# Patient Record
Sex: MALE | Race: BLACK OR AFRICAN AMERICAN | NOT HISPANIC OR LATINO | Employment: FULL TIME | URBAN - METROPOLITAN AREA
[De-identification: names, ages, dates, MRNs, and addresses within clinical notes are randomized per-mention and may not be internally consistent; named-entity substitution may affect disease eponyms.]

---

## 2024-03-04 PROBLEM — H15.012 ANTERIOR SCLERITIS, LEFT EYE: Status: ACTIVE | Noted: 2024-03-04

## 2024-03-04 PROBLEM — E78.5 HYPERLIPEMIA: Status: ACTIVE | Noted: 2024-03-04

## 2024-03-04 PROBLEM — I83.019: Status: ACTIVE | Noted: 2024-03-04

## 2024-03-04 PROBLEM — R20.2 PARESTHESIA OF BOTH LEGS: Status: ACTIVE | Noted: 2024-03-04

## 2024-03-04 PROBLEM — R07.9 CHEST PAIN: Status: ACTIVE | Noted: 2024-03-04

## 2024-03-04 PROBLEM — S61.219A LACERATION OF SKIN OF FINGER: Status: ACTIVE | Noted: 2024-03-04

## 2024-03-04 PROBLEM — L73.9 FOLLICULITIS: Status: ACTIVE | Noted: 2024-03-04

## 2024-03-04 PROBLEM — R10.11 ABDOMINAL PAIN, RUQ (RIGHT UPPER QUADRANT): Status: ACTIVE | Noted: 2024-03-04

## 2024-03-04 PROBLEM — R00.2 PALPITATIONS: Status: ACTIVE | Noted: 2024-03-04

## 2024-03-04 PROBLEM — K21.9 GERD (GASTROESOPHAGEAL REFLUX DISEASE): Status: ACTIVE | Noted: 2024-03-04

## 2024-03-04 PROBLEM — R10.9 RIGHT FLANK PAIN: Status: ACTIVE | Noted: 2024-03-04

## 2024-03-04 PROBLEM — A60.00 GENITAL HERPES SIMPLEX: Status: ACTIVE | Noted: 2024-03-04

## 2024-03-04 PROBLEM — I83.899 BLEEDING FROM VARICOSE VEIN: Status: ACTIVE | Noted: 2024-03-04

## 2024-03-04 PROBLEM — L97.919: Status: ACTIVE | Noted: 2024-03-04

## 2024-03-04 RX ORDER — INDOMETHACIN 25 MG/1
CAPSULE ORAL
COMMUNITY
Start: 2021-11-16 | End: 2024-04-04 | Stop reason: WASHOUT

## 2024-03-04 RX ORDER — PREDNISOLONE ACETATE 10 MG/ML
SUSPENSION/ DROPS OPHTHALMIC 4 TIMES DAILY
COMMUNITY
Start: 2021-11-16 | End: 2024-04-04 | Stop reason: WASHOUT

## 2024-03-04 RX ORDER — CYCLOPENTOLATE HYDROCHLORIDE 10 MG/ML
SOLUTION/ DROPS OPHTHALMIC
COMMUNITY
Start: 2021-11-16 | End: 2024-04-04 | Stop reason: WASHOUT

## 2024-03-04 RX ORDER — GLUCOSAM/CHONDRO/HERB 149/HYAL 750-100 MG
TABLET ORAL
COMMUNITY

## 2024-03-05 ENCOUNTER — APPOINTMENT (OUTPATIENT)
Dept: PRIMARY CARE | Facility: CLINIC | Age: 44
End: 2024-03-05
Payer: COMMERCIAL

## 2024-03-12 ENCOUNTER — APPOINTMENT (OUTPATIENT)
Dept: PRIMARY CARE | Facility: CLINIC | Age: 44
End: 2024-03-12
Payer: COMMERCIAL

## 2024-04-04 ENCOUNTER — OFFICE VISIT (OUTPATIENT)
Dept: PRIMARY CARE | Facility: CLINIC | Age: 44
End: 2024-04-04
Payer: COMMERCIAL

## 2024-04-04 VITALS
OXYGEN SATURATION: 100 % | DIASTOLIC BLOOD PRESSURE: 103 MMHG | SYSTOLIC BLOOD PRESSURE: 153 MMHG | TEMPERATURE: 97.3 F | BODY MASS INDEX: 28.71 KG/M2 | WEIGHT: 212 LBS | HEART RATE: 84 BPM | RESPIRATION RATE: 16 BRPM | HEIGHT: 72 IN

## 2024-04-04 DIAGNOSIS — E66.3 OVERWEIGHT: ICD-10-CM

## 2024-04-04 DIAGNOSIS — F10.982 ALCOHOL-INDUCED INSOMNIA (MULTI): ICD-10-CM

## 2024-04-04 DIAGNOSIS — I10 PRIMARY HYPERTENSION: Primary | ICD-10-CM

## 2024-04-04 DIAGNOSIS — Z11.3 SCREENING FOR STD (SEXUALLY TRANSMITTED DISEASE): ICD-10-CM

## 2024-04-04 DIAGNOSIS — F10.10 ALCOHOL ABUSE: ICD-10-CM

## 2024-04-04 DIAGNOSIS — R29.818 SUSPECTED SLEEP APNEA: ICD-10-CM

## 2024-04-04 LAB
ANION GAP SERPL CALC-SCNC: 18 MMOL/L (ref 10–20)
BUN SERPL-MCNC: 10 MG/DL (ref 6–23)
CALCIUM SERPL-MCNC: 9.9 MG/DL (ref 8.6–10.6)
CHLORIDE SERPL-SCNC: 100 MMOL/L (ref 98–107)
CHOLEST SERPL-MCNC: 295 MG/DL (ref 0–199)
CHOLESTEROL/HDL RATIO: 3.1
CO2 SERPL-SCNC: 26 MMOL/L (ref 21–32)
CREAT SERPL-MCNC: 1.01 MG/DL (ref 0.5–1.3)
EGFRCR SERPLBLD CKD-EPI 2021: >90 ML/MIN/1.73M*2
ERYTHROCYTE [DISTWIDTH] IN BLOOD BY AUTOMATED COUNT: 12.5 % (ref 11.5–14.5)
EST. AVERAGE GLUCOSE BLD GHB EST-MCNC: 111 MG/DL
GLUCOSE SERPL-MCNC: 84 MG/DL (ref 74–99)
HBA1C MFR BLD: 5.5 %
HCT VFR BLD AUTO: 49.9 % (ref 41–52)
HDLC SERPL-MCNC: 95.5 MG/DL
HGB BLD-MCNC: 16 G/DL (ref 13.5–17.5)
HIV 1+2 AB+HIV1 P24 AG SERPL QL IA: NONREACTIVE
LDLC SERPL CALC-MCNC: 184 MG/DL
MCH RBC QN AUTO: 28.2 PG (ref 26–34)
MCHC RBC AUTO-ENTMCNC: 32.1 G/DL (ref 32–36)
MCV RBC AUTO: 88 FL (ref 80–100)
NON HDL CHOLESTEROL: 200 MG/DL (ref 0–149)
NRBC BLD-RTO: 0 /100 WBCS (ref 0–0)
PLATELET # BLD AUTO: 186 X10*3/UL (ref 150–450)
POTASSIUM SERPL-SCNC: 3.8 MMOL/L (ref 3.5–5.3)
RBC # BLD AUTO: 5.67 X10*6/UL (ref 4.5–5.9)
SODIUM SERPL-SCNC: 140 MMOL/L (ref 136–145)
TREPONEMA PALLIDUM IGG+IGM AB [PRESENCE] IN SERUM OR PLASMA BY IMMUNOASSAY: NONREACTIVE
TRIGL SERPL-MCNC: 79 MG/DL (ref 0–149)
TSH SERPL-ACNC: 1.2 MIU/L (ref 0.44–3.98)
VLDL: 16 MG/DL (ref 0–40)
WBC # BLD AUTO: 6.4 X10*3/UL (ref 4.4–11.3)

## 2024-04-04 PROCEDURE — 1036F TOBACCO NON-USER: CPT | Performed by: NURSE PRACTITIONER

## 2024-04-04 PROCEDURE — 87661 TRICHOMONAS VAGINALIS AMPLIF: CPT | Mod: 59 | Performed by: NURSE PRACTITIONER

## 2024-04-04 PROCEDURE — 86780 TREPONEMA PALLIDUM: CPT | Performed by: NURSE PRACTITIONER

## 2024-04-04 PROCEDURE — 87389 HIV-1 AG W/HIV-1&-2 AB AG IA: CPT | Performed by: NURSE PRACTITIONER

## 2024-04-04 PROCEDURE — 3080F DIAST BP >= 90 MM HG: CPT | Performed by: NURSE PRACTITIONER

## 2024-04-04 PROCEDURE — 99204 OFFICE O/P NEW MOD 45 MIN: CPT | Performed by: NURSE PRACTITIONER

## 2024-04-04 PROCEDURE — 80061 LIPID PANEL: CPT | Performed by: NURSE PRACTITIONER

## 2024-04-04 PROCEDURE — 87800 DETECT AGNT MULT DNA DIREC: CPT | Performed by: NURSE PRACTITIONER

## 2024-04-04 PROCEDURE — 80048 BASIC METABOLIC PNL TOTAL CA: CPT | Performed by: NURSE PRACTITIONER

## 2024-04-04 PROCEDURE — 36415 COLL VENOUS BLD VENIPUNCTURE: CPT | Performed by: NURSE PRACTITIONER

## 2024-04-04 PROCEDURE — 83036 HEMOGLOBIN GLYCOSYLATED A1C: CPT | Performed by: NURSE PRACTITIONER

## 2024-04-04 PROCEDURE — 85027 COMPLETE CBC AUTOMATED: CPT | Performed by: NURSE PRACTITIONER

## 2024-04-04 PROCEDURE — 84443 ASSAY THYROID STIM HORMONE: CPT | Performed by: NURSE PRACTITIONER

## 2024-04-04 PROCEDURE — 99214 OFFICE O/P EST MOD 30 MIN: CPT | Performed by: NURSE PRACTITIONER

## 2024-04-04 PROCEDURE — 3077F SYST BP >= 140 MM HG: CPT | Performed by: NURSE PRACTITIONER

## 2024-04-04 RX ORDER — LOSARTAN POTASSIUM 50 MG/1
50 TABLET ORAL DAILY
Qty: 90 TABLET | Refills: 1 | Status: SHIPPED | OUTPATIENT
Start: 2024-04-04 | End: 2025-04-04

## 2024-04-04 RX ORDER — CALCIUM CARB/MAGNESIUM CARB 311-232MG
5 TABLET ORAL NIGHTLY PRN
Qty: 90 TABLET | Refills: 3 | Status: SHIPPED | OUTPATIENT
Start: 2024-04-04

## 2024-04-04 ASSESSMENT — PAIN SCALES - GENERAL: PAINLEVEL: 0-NO PAIN

## 2024-04-04 NOTE — PROGRESS NOTES
"Subjective   Merry Sutherland is a 43 y.o. male who presents for Establish Care.  HPI  Mr. Sutherland is here today for NPV. Patient denies known past medical history.   Notes that he was once told he had high blood pressure but at the time was recommended for lifestyle modifications. Denies headache, chest pain, palpitations, blurred vision, or dizziness    He reports that in the last few months he has been drinking alcohol in excess. He is motivated to change his lifestyle. He declines resources. He is unsure of family history of HTN or otherwise. He does like to exercise, and did so this morning. He would also like to work on improving his diet and sleep.  Notes that he will fall asleep in his \"man cave\" often with the lights and television on. He will then attempt to go up into his bed and will have challenge falling asleep. He does snore, often very loudly. Denies previous evaluation for sleep apnea.       All systems reviewed. Review of systems negative except for noted positives in HPI    Objective     BP (!) 153/103   Pulse 84   Temp 36.3 °C (97.3 °F)   Resp 16   Ht 1.829 m (6')   Wt 96.2 kg (212 lb)   SpO2 100%   BMI 28.75 kg/m²    Vital signs noted and reviewed.       Physical Exam  Constitutional:       Appearance: Normal appearance.   Cardiovascular:      Rate and Rhythm: Normal rate and regular rhythm.   Pulmonary:      Effort: Pulmonary effort is normal. No respiratory distress.      Breath sounds: Normal breath sounds.   Skin:     General: Skin is warm and dry.   Neurological:      Mental Status: He is oriented to person, place, and time.   Psychiatric:         Mood and Affect: Mood normal.             Assessment/Plan   Problem List Items Addressed This Visit    None  Visit Diagnoses       Primary hypertension    -  Primary    Relevant Medications    losartan (Cozaar) 50 mg tablet    Other Relevant Orders    Basic Metabolic Panel    Hemoglobin A1C    TSH with reflex to Free T4 if abnormal    CBC    " Suspected sleep apnea        Relevant Orders    Referral to Adult Sleep Medicine    Alcohol-induced insomnia (CMS/HCC)        Relevant Medications    melatonin 5 mg tablet,disintegrating    Screening for STD (sexually transmitted disease)        Relevant Orders    Syphilis Screen with Reflex    C. Trachomatis / N. Gonorrhoeae, Amplified Detection    Trichomonas vaginalis, Nucleic Acid Detection    HIV 1/2 Antigen/Antibody Screen with Reflex to Confirmation    Alcohol abuse        Overweight        Relevant Orders    Lipid panel    TSH with reflex to Free T4 if abnormal    CBC

## 2024-04-04 NOTE — PATIENT INSTRUCTIONS
Thank you for coming in for your visit today!    Please follow up in 6 weeks for next blood pressure check    For your blood pressure:  START losartan. This medication may be taken in the morning or at night. Be sure to take the medication consistently.   Take your medication as directed. Try to take it around the same time daily.   Keep a log of your blood pressure. Be sure to bring it with you to your next appointment so we can review it together.  Adhere to the DASH diet. This includes decreasing your salt/sodium intake. Avoid canned foods, lunch meats, and frozen foods.  Exercise for 30 minutes daily.  Reduce or eliminate alcohol consumption.    Take melatonin 30-60 minutes prior to desired bedtime.   Develop a sleep routine.  Discontinue all screens prior to bedtime including cell phone or television.     Call to schedule with sleep medicine for suspected sleep apnea.     For reflux:   AVOID spicy, acidic, or fried foods.  DO NOT EAT 2-3 hours before lying flat or going to bed  Decrease or eliminate alcohol consumption.     Today we completed blood work. We will contact you with any abnormalities from this testing.    Call 911 or go to the emergency room if you have pain in your chest, difficulty breathing, or other life threatening symptoms.

## 2024-04-05 LAB
C TRACH RRNA SPEC QL NAA+PROBE: NEGATIVE
N GONORRHOEA DNA SPEC QL PROBE+SIG AMP: NEGATIVE
T VAGINALIS RRNA SPEC QL NAA+PROBE: NEGATIVE

## 2024-10-14 ENCOUNTER — APPOINTMENT (OUTPATIENT)
Dept: PRIMARY CARE | Facility: CLINIC | Age: 44
End: 2024-10-14
Payer: COMMERCIAL

## 2024-11-14 ENCOUNTER — OFFICE VISIT (OUTPATIENT)
Dept: PRIMARY CARE | Facility: CLINIC | Age: 44
End: 2024-11-14
Payer: COMMERCIAL

## 2024-11-14 VITALS
HEART RATE: 101 BPM | RESPIRATION RATE: 16 BRPM | OXYGEN SATURATION: 99 % | WEIGHT: 206 LBS | SYSTOLIC BLOOD PRESSURE: 137 MMHG | HEIGHT: 72 IN | TEMPERATURE: 98 F | BODY MASS INDEX: 27.9 KG/M2 | DIASTOLIC BLOOD PRESSURE: 80 MMHG

## 2024-11-14 DIAGNOSIS — I10 PRIMARY HYPERTENSION: ICD-10-CM

## 2024-11-14 DIAGNOSIS — G89.29 CHRONIC LEFT-SIDED LOW BACK PAIN WITH LEFT-SIDED SCIATICA: ICD-10-CM

## 2024-11-14 DIAGNOSIS — E78.2 MIXED HYPERLIPIDEMIA: ICD-10-CM

## 2024-11-14 DIAGNOSIS — Z11.3 SCREENING FOR STD (SEXUALLY TRANSMITTED DISEASE): ICD-10-CM

## 2024-11-14 DIAGNOSIS — M54.42 CHRONIC LEFT-SIDED LOW BACK PAIN WITH LEFT-SIDED SCIATICA: ICD-10-CM

## 2024-11-14 DIAGNOSIS — D17.1 LIPOMA OF TORSO: Primary | ICD-10-CM

## 2024-11-14 LAB
25(OH)D3 SERPL-MCNC: 15 NG/ML (ref 30–100)
ANION GAP SERPL CALC-SCNC: 17 MMOL/L (ref 10–20)
BUN SERPL-MCNC: 12 MG/DL (ref 6–23)
CALCIUM SERPL-MCNC: 9.4 MG/DL (ref 8.6–10.6)
CHLORIDE SERPL-SCNC: 102 MMOL/L (ref 98–107)
CHOLEST SERPL-MCNC: 272 MG/DL (ref 0–199)
CHOLESTEROL/HDL RATIO: 3.3
CO2 SERPL-SCNC: 25 MMOL/L (ref 21–32)
CREAT SERPL-MCNC: 0.97 MG/DL (ref 0.5–1.3)
EGFRCR SERPLBLD CKD-EPI 2021: >90 ML/MIN/1.73M*2
EST. AVERAGE GLUCOSE BLD GHB EST-MCNC: 105 MG/DL
GLUCOSE SERPL-MCNC: 83 MG/DL (ref 74–99)
HBA1C MFR BLD: 5.3 %
HDLC SERPL-MCNC: 82.3 MG/DL
HIV 1+2 AB+HIV1 P24 AG SERPL QL IA: NONREACTIVE
LDLC SERPL CALC-MCNC: 163 MG/DL
NON HDL CHOLESTEROL: 190 MG/DL (ref 0–149)
POTASSIUM SERPL-SCNC: 3.9 MMOL/L (ref 3.5–5.3)
SODIUM SERPL-SCNC: 140 MMOL/L (ref 136–145)
TREPONEMA PALLIDUM IGG+IGM AB [PRESENCE] IN SERUM OR PLASMA BY IMMUNOASSAY: NONREACTIVE
TRIGL SERPL-MCNC: 136 MG/DL (ref 0–149)
VLDL: 27 MG/DL (ref 0–40)

## 2024-11-14 PROCEDURE — 99214 OFFICE O/P EST MOD 30 MIN: CPT | Performed by: NURSE PRACTITIONER

## 2024-11-14 PROCEDURE — 82306 VITAMIN D 25 HYDROXY: CPT | Performed by: NURSE PRACTITIONER

## 2024-11-14 PROCEDURE — 83036 HEMOGLOBIN GLYCOSYLATED A1C: CPT | Performed by: NURSE PRACTITIONER

## 2024-11-14 PROCEDURE — 36415 COLL VENOUS BLD VENIPUNCTURE: CPT | Performed by: NURSE PRACTITIONER

## 2024-11-14 PROCEDURE — 86780 TREPONEMA PALLIDUM: CPT | Performed by: NURSE PRACTITIONER

## 2024-11-14 PROCEDURE — 80061 LIPID PANEL: CPT | Performed by: NURSE PRACTITIONER

## 2024-11-14 PROCEDURE — 3008F BODY MASS INDEX DOCD: CPT | Performed by: NURSE PRACTITIONER

## 2024-11-14 PROCEDURE — 80048 BASIC METABOLIC PNL TOTAL CA: CPT | Performed by: NURSE PRACTITIONER

## 2024-11-14 PROCEDURE — 90715 TDAP VACCINE 7 YRS/> IM: CPT | Performed by: NURSE PRACTITIONER

## 2024-11-14 PROCEDURE — 3075F SYST BP GE 130 - 139MM HG: CPT | Performed by: NURSE PRACTITIONER

## 2024-11-14 PROCEDURE — 87389 HIV-1 AG W/HIV-1&-2 AB AG IA: CPT | Performed by: NURSE PRACTITIONER

## 2024-11-14 PROCEDURE — 87491 CHLMYD TRACH DNA AMP PROBE: CPT | Performed by: NURSE PRACTITIONER

## 2024-11-14 PROCEDURE — 87661 TRICHOMONAS VAGINALIS AMPLIF: CPT | Performed by: NURSE PRACTITIONER

## 2024-11-14 PROCEDURE — 3079F DIAST BP 80-89 MM HG: CPT | Performed by: NURSE PRACTITIONER

## 2024-11-14 PROCEDURE — 1036F TOBACCO NON-USER: CPT | Performed by: NURSE PRACTITIONER

## 2024-11-14 ASSESSMENT — PAIN SCALES - GENERAL: PAINLEVEL_OUTOF10: 0-NO PAIN

## 2024-11-14 NOTE — PROGRESS NOTES
The patient received Tdap Adacel vaccine in the right deltoid.   Screening is done.   VIS sheet is given and reviewed.  Patient tolerated well.

## 2024-11-14 NOTE — PATIENT INSTRUCTIONS
Thank you for coming in for your visit today!    Please follow up in 5 months or sooner if needed    Today we completed blood work. We will contact you with any abnormalities from this testing.      For your blood pressure:  Continue to reduce alcohol consumption  Keep a log of your blood pressure. Be sure to bring it with you to your next appointment so we can review it together.  Adhere to the DASH diet. This includes decreasing your salt/sodium intake. Avoid canned foods, lunch meats, and frozen foods.  Exercise for 30 minutes daily.    For back pain and guidance with stretches, call to schedule with physical therapy.    For lipoma removal, call to schedule with general surgery.    Call 911 or go to the emergency room if you have pain in your chest, difficulty breathing, or other life threatening symptoms.

## 2024-11-14 NOTE — PROGRESS NOTES
Subjective   Merry Sutherland is a 44 y.o. male who presents for Follow-up (ED).  HPI  Mr. Sutherland is a 45 yo M here today for follow up.  Lost to previous follow up after starting losartan in April. Denies headache, chest pain, palpitations, blurred vision, or dizziness. He has not taken medication in several months.  Alcohol intake: he has reduced, now less than 3 drinks 3x per week. He notes that he does still occasionally binge, last being when he went out of town to celebrate his birthday. He is unsure of blood pressure values at that time, but denies symptoms. He notes a reduction in life stress- he was previously dealing with grief as well as some court related stressors and was coping with alcohol in the past. He feels he is managing stress in healthy ways now.     Was seen in the ED in August for heart palpitations. Today patient reports he has not experienced these symptoms in recent times.     Wants to improve his diet. Declines a nutritionist at this time. He notes that he is also focused on exercising more regularly.     He notes that he was previously told that he has a pinched nerve in his L leg and foot Notes some pins and needles sensation. This is unilateral. He discussed going to an acupuncturist in the past but never fulfilled the referral. Notes some low back pain and occasional neck pain, which comes and goes.   Doesn't stretch, now has a gym, just moved. May be open to PT consult.    History of axillary boils, but has an area of concern on his back. It has been there for some time. It is not painful and does not have discharge.         All systems reviewed. Review of systems negative except for noted positives in HPI    Objective     /80   Pulse 101   Temp 36.7 °C (98 °F)   Resp 16   Ht 1.829 m (6')   Wt 93.4 kg (206 lb)   SpO2 99%   BMI 27.94 kg/m²    Vital signs noted and reviewed.       Physical Exam  Constitutional:       Appearance: Normal appearance.   Cardiovascular:      Rate  and Rhythm: Normal rate and regular rhythm.   Pulmonary:      Effort: Pulmonary effort is normal. No respiratory distress.      Breath sounds: Normal breath sounds.   Skin:     General: Skin is warm and dry.      Comments: Lipoma to L mid back.   Neurological:      Mental Status: He is oriented to person, place, and time.   Psychiatric:         Mood and Affect: Mood normal.             Assessment/Plan   Problem List Items Addressed This Visit       Hyperlipemia    Relevant Orders    Lipid Panel     Other Visit Diagnoses       Lipoma of torso    -  Primary    Relevant Orders    Referral to General Surgery    Primary hypertension        Relevant Orders    Hemoglobin A1C    Basic Metabolic Panel    Vitamin D 25-Hydroxy,Total (for eval of Vitamin D levels)    Chronic left-sided low back pain with left-sided sciatica        Relevant Orders    Referral to Physical Therapy    Hemoglobin A1C    Screening for STD (sexually transmitted disease)        Relevant Orders    Syphilis Screen with Reflex    C. trachomatis / N. gonorrhoeae, Amplified    Trichomonas vaginalis, Amplified    HIV 1/2 Antigen/Antibody Screen with Reflex to Confirmation

## 2025-01-08 ENCOUNTER — APPOINTMENT (OUTPATIENT)
Dept: SURGERY | Facility: CLINIC | Age: 45
End: 2025-01-08
Payer: COMMERCIAL

## 2025-05-06 ENCOUNTER — APPOINTMENT (OUTPATIENT)
Dept: PRIMARY CARE | Facility: CLINIC | Age: 45
End: 2025-05-06
Payer: COMMERCIAL

## 2025-07-24 ENCOUNTER — PATIENT MESSAGE (OUTPATIENT)
Dept: CARE COORDINATION | Facility: CLINIC | Age: 45
End: 2025-07-24
Payer: COMMERCIAL